# Patient Record
Sex: MALE | URBAN - METROPOLITAN AREA
[De-identification: names, ages, dates, MRNs, and addresses within clinical notes are randomized per-mention and may not be internally consistent; named-entity substitution may affect disease eponyms.]

---

## 2019-03-01 ENCOUNTER — APPOINTMENT (OUTPATIENT)
Dept: URBAN - METROPOLITAN AREA CLINIC 237 | Age: 59
Setting detail: DERMATOLOGY
End: 2019-03-01

## 2019-03-01 DIAGNOSIS — B07.8 OTHER VIRAL WARTS: ICD-10-CM

## 2019-03-01 DIAGNOSIS — K13.0 DISEASES OF LIPS: ICD-10-CM

## 2019-03-01 PROCEDURE — OTHER OTHER: OTHER

## 2019-03-01 PROCEDURE — OTHER BIOPSY BY SHAVE METHOD: OTHER

## 2019-03-01 PROCEDURE — OTHER PATIENT SPECIFIC COUNSELING: OTHER

## 2019-03-01 PROCEDURE — OTHER TREATMENT REGIMEN: OTHER

## 2019-03-01 PROCEDURE — OTHER PRESCRIPTION: OTHER

## 2019-03-01 PROCEDURE — OTHER PHOTO-DOCUMENTATION: OTHER

## 2019-03-01 PROCEDURE — OTHER DIAGNOSIS COMMENT: OTHER

## 2019-03-01 PROCEDURE — 99201: CPT | Mod: 25

## 2019-03-01 PROCEDURE — OTHER MIPS QUALITY: OTHER

## 2019-03-01 RX ORDER — NYSTATIN AND TRIAMCINOLONE ACETONIDE 100000; 1 [USP'U]/G; MG/G
SM AMT CREAM TOPICAL BID
Qty: 1 | Refills: 0 | Status: ERX | COMMUNITY
Start: 2019-03-01

## 2019-03-01 ASSESSMENT — LOCATION DETAILED DESCRIPTION DERM
LOCATION DETAILED: LEFT SUPERIOR VERMILION LIP
LOCATION DETAILED: LEFT INFERIOR VERMILION LIP

## 2019-03-01 ASSESSMENT — LOCATION SIMPLE DESCRIPTION DERM: LOCATION SIMPLE: LEFT LIP

## 2019-03-01 ASSESSMENT — LOCATION ZONE DERM: LOCATION ZONE: LIP

## 2019-03-01 NOTE — PROCEDURE: PATIENT SPECIFIC COUNSELING
Advised pt this does not look like HSV, as there are several areas of desquamating skin. MD uncertain as to the dx, but would like to see how response to tx with Mycolog II and AHU.\\n\\nConfirmed no h/o any allergies. Advised it is possible Pt is reacting to something that he is eating or anything else that touch lips.
Detail Level: Zone
MD uncertain what bumps could be, but corner of mouth does look like angular cheilitis. MD suggested Mycolog-II and AHU. \\n\\n\\nIt’s unlikely this is a vitamin deficiency given Pt’s supplementation.
Initially lesion looked warty. MD recommends removing for testing.

## 2019-03-01 NOTE — PROCEDURE: OTHER
Detail Level: Simple
Other (Free Text): MD did not do a bx, per se. The specimen came off easily with tweezers.
Note Text (......Xxx Chief Complaint.): This diagnosis correlates with the SKs below bra line.

## 2019-03-01 NOTE — PROCEDURE: BIOPSY BY SHAVE METHOD
Type Of Destruction Used: Curettage
Consent: Written consent was obtained and risks were reviewed including but not limited to scarring (including abnormal scar), bleeding, discoloration, infection, incomplete removal, recurrence, and discomfort.
Electrodesiccation Text: The wound bed was treated with electrodesiccation after the biopsy was performed.
Anesthesia Type: 1% lidocaine with epinephrine and a 1:10 solution of 8.4% sodium bicarbonate
Post-Care Instructions: I reviewed the post-op instructions with the patient. Patient is to keep the wound clean by washing at least daily with soap and water, then apply Aquaphor Healing Ointment or Vaseline. Patient may need to use hydrogen peroxide soaks to remove any dried blood or crust.
Render Post-Care Instructions In Note?: yes
Wound Care: Vaseline
X Size Of Lesion In Cm: 0
Hemostasis: Drysol
Silver Nitrate Text: The wound bed was treated with silver nitrate after the biopsy was performed.
Curettage Text: The wound bed was treated with curettage after the biopsy was performed.
Billing Type: Third-Party Bill
Notification Instructions: Patient instructed to call office for  biopsy results if not heard back from us in 1week.
Biopsy Method: scissors
Electrodesiccation And Curettage Text: The wound bed was treated with electrodesiccation and curettage after the biopsy was performed.
Cryotherapy Text: The wound bed was treated with cryotherapy after the biopsy was performed.
Bill [id number] For Specimen Handling/Conveyance To Laboratory?: no
Dressing: bandage
Path Notes (To The Dermatopathologist): Vs stratum corneum
Detail Level: Simple
Biopsy Type: H and E
Depth Of Biopsy: dermis

## 2019-11-01 ENCOUNTER — APPOINTMENT (OUTPATIENT)
Dept: URBAN - METROPOLITAN AREA CLINIC 237 | Age: 59
Setting detail: DERMATOLOGY
End: 2019-11-01

## 2019-11-01 DIAGNOSIS — R21 RASH AND OTHER NONSPECIFIC SKIN ERUPTION: ICD-10-CM

## 2019-11-01 DIAGNOSIS — L50.3 DERMATOGRAPHIC URTICARIA: ICD-10-CM

## 2019-11-01 PROCEDURE — OTHER DIAGNOSIS COMMENT: OTHER

## 2019-11-01 PROCEDURE — OTHER COUNSELING: OTHER

## 2019-11-01 PROCEDURE — OTHER KOH PREP: OTHER

## 2019-11-01 PROCEDURE — OTHER TREATMENT REGIMEN: OTHER

## 2019-11-01 PROCEDURE — OTHER PRESCRIPTION: OTHER

## 2019-11-01 PROCEDURE — 99213 OFFICE O/P EST LOW 20 MIN: CPT

## 2019-11-01 PROCEDURE — 87220 TISSUE EXAM FOR FUNGI: CPT

## 2019-11-01 PROCEDURE — OTHER PATIENT SPECIFIC COUNSELING: OTHER

## 2019-11-01 RX ORDER — TRIAMCINOLONE ACETONIDE 1 MG/ML
SM AMT LOTION TOPICAL BID-TID
Qty: 2 | Refills: 0 | Status: ERX | COMMUNITY
Start: 2019-11-01

## 2019-11-01 ASSESSMENT — LOCATION DETAILED DESCRIPTION DERM
LOCATION DETAILED: LEFT POPLITEAL SKIN
LOCATION DETAILED: LEFT DISTAL RADIAL DORSAL FOREARM
LOCATION DETAILED: RIGHT KNEE
LOCATION DETAILED: LEFT POSTERIOR SHOULDER
LOCATION DETAILED: RIGHT POSTERIOR SHOULDER
LOCATION DETAILED: LEFT KNEE
LOCATION DETAILED: RIGHT POPLITEAL SKIN
LOCATION DETAILED: RIGHT PROXIMAL DORSAL FOREARM
LOCATION DETAILED: LEFT SUPERIOR MEDIAL LOWER BACK
LOCATION DETAILED: SUPERIOR LUMBAR SPINE

## 2019-11-01 ASSESSMENT — LOCATION SIMPLE DESCRIPTION DERM
LOCATION SIMPLE: LOWER BACK
LOCATION SIMPLE: LEFT FOREARM
LOCATION SIMPLE: LEFT SHOULDER
LOCATION SIMPLE: RIGHT SHOULDER
LOCATION SIMPLE: RIGHT KNEE
LOCATION SIMPLE: RIGHT POPLITEAL SKIN
LOCATION SIMPLE: LEFT KNEE
LOCATION SIMPLE: RIGHT FOREARM
LOCATION SIMPLE: LEFT LOWER BACK
LOCATION SIMPLE: LEFT POPLITEAL SKIN

## 2019-11-01 ASSESSMENT — LOCATION ZONE DERM
LOCATION ZONE: TRUNK
LOCATION ZONE: ARM
LOCATION ZONE: LEG

## 2019-11-01 NOTE — PROCEDURE: KOH PREP
Showing: negative findings within normal realm
Koh Intro Text (From The.....): A KOH prep was ordered and evaluated from the
Koh Procedure Text (Tissue Harvesting Technique): A 15-blade scalpel was used to scrape the skin. The skin scrapings were placed on a glass slide, covered with a coverslip and a KOH solution was applied.
Detail Level: Simple

## 2019-11-01 NOTE — PROCEDURE: PATIENT SPECIFIC COUNSELING
Detail Level: Zone
See above
Suggested doing KOH (advise pt it was negative). So for now, will tx symptomatically. Discussed starting a daily AH to help with itch. Will also prescribe a topical steroid. Keep an eye out if family starts to have itch/get a rash.

## 2019-11-01 NOTE — PROCEDURE: TREATMENT REGIMEN
Initiate Treatment: See above
Detail Level: Simple
Initiate Treatment: TMC 0.1% lotion bid-TID PRN \\nLoratidine/other anti-histamine qd

## 2019-12-31 ENCOUNTER — APPOINTMENT (OUTPATIENT)
Dept: URBAN - METROPOLITAN AREA CLINIC 237 | Age: 59
Setting detail: DERMATOLOGY
End: 2019-12-31

## 2019-12-31 DIAGNOSIS — L50.3 DERMATOGRAPHIC URTICARIA: ICD-10-CM

## 2019-12-31 DIAGNOSIS — R21 RASH AND OTHER NONSPECIFIC SKIN ERUPTION: ICD-10-CM

## 2019-12-31 PROCEDURE — OTHER TREATMENT REGIMEN: OTHER

## 2019-12-31 PROCEDURE — 99213 OFFICE O/P EST LOW 20 MIN: CPT | Mod: 25

## 2019-12-31 PROCEDURE — OTHER KOH PREP: OTHER

## 2019-12-31 PROCEDURE — OTHER DIAGNOSIS COMMENT: OTHER

## 2019-12-31 PROCEDURE — OTHER PRESCRIPTION: OTHER

## 2019-12-31 PROCEDURE — OTHER INTRAMUSCULAR KENALOG: OTHER

## 2019-12-31 PROCEDURE — 96372 THER/PROPH/DIAG INJ SC/IM: CPT

## 2019-12-31 PROCEDURE — OTHER PATIENT SPECIFIC COUNSELING: OTHER

## 2019-12-31 PROCEDURE — 87220 TISSUE EXAM FOR FUNGI: CPT

## 2019-12-31 RX ORDER — TRIAMCINOLONE ACETONIDE 1 MG/ML
SM AMT LOTION TOPICAL BID
Qty: 2 | Refills: 1 | Status: ERX

## 2019-12-31 ASSESSMENT — LOCATION SIMPLE DESCRIPTION DERM
LOCATION SIMPLE: LEFT POPLITEAL SKIN
LOCATION SIMPLE: RIGHT POPLITEAL SKIN
LOCATION SIMPLE: RIGHT FOREARM
LOCATION SIMPLE: LEFT BUTTOCK
LOCATION SIMPLE: LEFT KNEE
LOCATION SIMPLE: LEFT SHOULDER
LOCATION SIMPLE: RIGHT SHOULDER
LOCATION SIMPLE: LEFT LOWER BACK
LOCATION SIMPLE: RIGHT KNEE
LOCATION SIMPLE: LEFT FOREARM

## 2019-12-31 ASSESSMENT — LOCATION DETAILED DESCRIPTION DERM
LOCATION DETAILED: RIGHT PROXIMAL DORSAL FOREARM
LOCATION DETAILED: RIGHT KNEE
LOCATION DETAILED: RIGHT POPLITEAL SKIN
LOCATION DETAILED: LEFT POSTERIOR SHOULDER
LOCATION DETAILED: LEFT DISTAL RADIAL DORSAL FOREARM
LOCATION DETAILED: LEFT KNEE
LOCATION DETAILED: RIGHT POSTERIOR SHOULDER
LOCATION DETAILED: LEFT BUTTOCK
LOCATION DETAILED: LEFT SUPERIOR LATERAL MIDBACK
LOCATION DETAILED: LEFT POPLITEAL SKIN
LOCATION DETAILED: LEFT SUPERIOR MEDIAL LOWER BACK

## 2019-12-31 ASSESSMENT — LOCATION ZONE DERM
LOCATION ZONE: LEG
LOCATION ZONE: ARM
LOCATION ZONE: TRUNK

## 2019-12-31 NOTE — PROCEDURE: INTRAMUSCULAR KENALOG
Administered By (Optional): TANIYA
Total Volume (Ccs): 1
Concentration (Mg/Ml) Of Additional Medication: 2.5
Kenalog Preparation: kenalog
Detail Level: None
Consent: The risks of atrophy were reviewed with the patient.
Add Option For Additional Mediation: No
Concentration (Mg/Ml): 40.0

## 2019-12-31 NOTE — PROCEDURE: TREATMENT REGIMEN
Detail Level: Simple
Initiate Treatment: Loratidine 10 mg q am
Detail Level: Zone
Initiate Treatment: Loratadine 10 mg qam
Continue Regimen: TMC 0.1% lotion bid-TID PRN \\nCeraVe Anti-Itch ad caterina \\nBenadryl qhs PRN

## 2019-12-31 NOTE — PROCEDURE: PATIENT SPECIFIC COUNSELING
PA reviewed pt’s photos of the progression of his rash. \\n\\nAdvised pt KOH was negative for scabies. PA recommends tx’ing rash with IM Kenalog to break itch/scratch cycle, then reassess.  Will probably need biopsy if rash recurs.
Detail Level: Zone